# Patient Record
Sex: FEMALE | Race: BLACK OR AFRICAN AMERICAN | NOT HISPANIC OR LATINO | URBAN - METROPOLITAN AREA
[De-identification: names, ages, dates, MRNs, and addresses within clinical notes are randomized per-mention and may not be internally consistent; named-entity substitution may affect disease eponyms.]

---

## 2018-12-25 ENCOUNTER — EMERGENCY (EMERGENCY)
Facility: HOSPITAL | Age: 33
LOS: 1 days | End: 2018-12-25
Attending: EMERGENCY MEDICINE
Payer: SELF-PAY

## 2018-12-25 VITALS
RESPIRATION RATE: 14 BRPM | WEIGHT: 179.9 LBS | DIASTOLIC BLOOD PRESSURE: 80 MMHG | TEMPERATURE: 99 F | SYSTOLIC BLOOD PRESSURE: 146 MMHG | OXYGEN SATURATION: 100 % | HEART RATE: 86 BPM

## 2018-12-25 PROCEDURE — 99284 EMERGENCY DEPT VISIT MOD MDM: CPT

## 2018-12-25 PROCEDURE — 93971 EXTREMITY STUDY: CPT

## 2018-12-25 RX ORDER — IBUPROFEN 200 MG
600 TABLET ORAL ONCE
Qty: 0 | Refills: 0 | Status: COMPLETED | OUTPATIENT
Start: 2018-12-25 | End: 2018-12-25

## 2018-12-25 RX ORDER — DIAZEPAM 5 MG
1 TABLET ORAL
Qty: 10 | Refills: 0 | OUTPATIENT
Start: 2018-12-25 | End: 2019-01-03

## 2018-12-25 RX ORDER — DIAZEPAM 5 MG
5 TABLET ORAL ONCE
Qty: 0 | Refills: 0 | Status: DISCONTINUED | OUTPATIENT
Start: 2018-12-25 | End: 2018-12-25

## 2018-12-25 RX ADMIN — Medication 600 MILLIGRAM(S): at 10:49

## 2018-12-25 RX ADMIN — Medication 5 MILLIGRAM(S): at 10:49

## 2018-12-25 NOTE — ED PROVIDER NOTE - MEDICAL DECISION MAKING DETAILS
luis low back pain after fall 5 days ago paraspinal no midline ttp slt neg , neuro intact no saddle [facility] no bowel or bladder - analgesia muscle relaxer -- pt also co l le swelling neg homans pos famho dvt/pe - will pocus and if neg dc no bony tttp
no dry mouth/no ear pain/no tinnitus/no sinus symptoms/no post-nasal discharge/no abnormal taste sensation/no gum bleeding/no hearing difficulty/no nasal discharge/no vertigo/no recurrent cold sores/no throat pain/no dysphagia/no nasal congestion/no nasal obstruction/no nose bleeds

## 2018-12-25 NOTE — ED PROVIDER NOTE - NSFOLLOWUPINSTRUCTIONS_ED_ALL_ED_FT
luis rest,  motrin 600mg every 8 hrs for pain, follow up with physician in 48 hrs valium for muscle spasm at night - tablet - no drinking no driving with meds -- repeat ultrasound of lower extremity in 5-7 days

## 2018-12-25 NOTE — ED PROVIDER NOTE - PHYSICAL EXAMINATION
[id number] nadncatperrl eomi s1s2 rrrctal abdsoftntnd rt paraspineal ttp lumbar glutel ttp slt neg stregth 5/5 le bl sensation intact nosaddle anesthesia rle no ttp ankle swelling no bony ttp - ambulating - neg homans

## 2018-12-25 NOTE — ED ADULT TRIAGE NOTE - CHIEF COMPLAINT QUOTE
slipped and hurt her back o Friday and her left ankle Pt was on a cruise in the [place] and not getting better taking muscle relaxer making her "woozy "

## 2018-12-25 NOTE — ED PROVIDER NOTE - OBJECTIVE STATEMENT
33 f with no sig pmhx presents sp slip and fall while on cruise fell backwards - no bowel or bladder dysfuntion no weakness no numbness minimal releif w motrin - pt also co left le swelling no new trauma able to ambulate - pt no prev dvt or pe -- pos fam ho dvt /pe -- no cp no sob

## 2018-12-25 NOTE — ED PROVIDER NOTE - CARE PLAN
Principal Discharge DX:	Acute right-sided low back pain without sciatica  Secondary Diagnosis:	Leg swelling